# Patient Record
Sex: FEMALE | Race: OTHER | HISPANIC OR LATINO | Employment: UNEMPLOYED | URBAN - METROPOLITAN AREA
[De-identification: names, ages, dates, MRNs, and addresses within clinical notes are randomized per-mention and may not be internally consistent; named-entity substitution may affect disease eponyms.]

---

## 2022-06-01 DIAGNOSIS — Z34.01 ENCOUNTER FOR SUPERVISION OF NORMAL FIRST PREGNANCY IN FIRST TRIMESTER: Primary | ICD-10-CM

## 2022-06-03 ENCOUNTER — PROCEDURE VISIT (OUTPATIENT)
Dept: OBSTETRICS AND GYNECOLOGY | Facility: CLINIC | Age: 22
End: 2022-06-03
Payer: MEDICAID

## 2022-06-03 DIAGNOSIS — Z34.02 ENCOUNTER FOR SUPERVISION OF NORMAL FIRST PREGNANCY IN SECOND TRIMESTER: Primary | ICD-10-CM

## 2022-06-03 DIAGNOSIS — Z34.01 ENCOUNTER FOR SUPERVISION OF NORMAL FIRST PREGNANCY IN FIRST TRIMESTER: ICD-10-CM

## 2022-06-03 PROCEDURE — 76805 OB US >/= 14 WKS SNGL FETUS: CPT | Mod: S$GLB,,, | Performed by: STUDENT IN AN ORGANIZED HEALTH CARE EDUCATION/TRAINING PROGRAM

## 2022-06-03 PROCEDURE — 76805 US OB/GYN PROCEDURE (VIEWPOINT): ICD-10-PCS | Mod: S$GLB,,, | Performed by: STUDENT IN AN ORGANIZED HEALTH CARE EDUCATION/TRAINING PROGRAM

## 2022-06-06 PROBLEM — O99.011 ANEMIA AFFECTING PREGNANCY IN FIRST TRIMESTER: Status: ACTIVE | Noted: 2022-06-06

## 2022-06-06 PROBLEM — O26.899 RH NEGATIVE STATE IN ANTEPARTUM PERIOD: Status: ACTIVE | Noted: 2022-06-06

## 2022-06-06 PROBLEM — Z67.91 RH NEGATIVE STATE IN ANTEPARTUM PERIOD: Status: ACTIVE | Noted: 2022-06-06

## 2022-06-06 LAB
ABS NRBC COUNT: 0 X 10 3/UL (ref 0–0.01)
ABSOLUTE BASOPHIL: 0.02 X 10 3/UL (ref 0–0.22)
ABSOLUTE EOSINOPHIL: 0.12 X 10 3/UL (ref 0.04–0.54)
ABSOLUTE IMMATURE GRAN: 0.09 X 10 3/UL (ref 0–0.04)
ABSOLUTE LYMPHOCYTE: 1.94 X 10 3/UL (ref 0.86–4.75)
ABSOLUTE MONOCYTE: 0.65 X 10 3/UL (ref 0.22–1.08)
AMPHETAMINES (500): NEGATIVE NG/ML
ANTIBODY SCREEN: NEGATIVE
BARBITURATES (200): NEGATIVE NG/ML
BASOPHILS NFR BLD: 0.2 % (ref 0.2–1.2)
BENZODIAZEPINES: NEGATIVE NG/ML
BLOOD GROUPING: NORMAL
BLOOD TYPE (D): NEGATIVE
COCAINE (150): NEGATIVE NG/ML
EOSINOPHIL NFR BLD: 1.2 % (ref 0.7–7)
HBV SURFACE AG SERPL QL IA: NONREACTIVE
HCT VFR BLD AUTO: 30.1 % (ref 37–47)
HCV IGG SERPL QL IA: NONREACTIVE
HGB BLD-MCNC: 9.8 G/DL (ref 12–16)
HIV 1+2 AB+HIV1 P24 AG SERPL QL IA: NONREACTIVE
IMMATURE GRANULOCYTES: 0.9 % (ref 0–0.5)
LYMPHOCYTES NFR BLD: 19.6 % (ref 19.3–53.1)
MARIJUANA, THC (50): NEGATIVE NG/ML
MCH RBC QN AUTO: 29.9 PG (ref 27–32)
MCHC RBC AUTO-ENTMCNC: 32.6 G/DL (ref 32–36)
MCV RBC AUTO: 91.8 FL (ref 82–100)
METHADONE: NEGATIVE NG/ML
MONOCYTES NFR BLD: 6.6 % (ref 4.7–12.5)
NEUTROPHILS # BLD AUTO: 7.09 X 10 3/UL (ref 2.15–7.56)
NEUTROPHILS NFR BLD: 71.5 % (ref 34–71.1)
NUCLEATED RED BLOOD CELLS: 0 /100 WBC (ref 0–0.2)
OPIATES: NEGATIVE NG/ML
OXYCODONE: NEGATIVE NG/ML
PH: 7.8 (ref 4.5–8)
PHENCYCLIDINE (25): NEGATIVE NG/ML
PLATELET # BLD AUTO: 296 X 10 3/UL (ref 135–400)
RBC # BLD AUTO: 3.28 X 10 6/UL (ref 4.2–5.4)
RDW-SD: 43.2 FL (ref 37–54)
RUBELLA IGG SCREEN: NORMAL
SICKLE CELL PREP: NEGATIVE
SYPHILIS TREPONEMAL ANTIBODY: NONREACTIVE
URINE CREATININE D/S: 48.3 MG/DL
WBC # BLD: 9.91 X 10 3/UL (ref 4.3–10.8)

## 2022-06-09 ENCOUNTER — TELEPHONE (OUTPATIENT)
Dept: OBSTETRICS AND GYNECOLOGY | Facility: CLINIC | Age: 22
End: 2022-06-09
Payer: MEDICAID

## 2022-06-10 ENCOUNTER — ROUTINE PRENATAL (OUTPATIENT)
Dept: OBSTETRICS AND GYNECOLOGY | Facility: CLINIC | Age: 22
End: 2022-06-10
Payer: MEDICAID

## 2022-06-10 VITALS — DIASTOLIC BLOOD PRESSURE: 72 MMHG | HEART RATE: 113 BPM | SYSTOLIC BLOOD PRESSURE: 108 MMHG | WEIGHT: 138 LBS

## 2022-06-10 DIAGNOSIS — Z67.91 BLOOD TYPE, RH NEGATIVE: ICD-10-CM

## 2022-06-10 DIAGNOSIS — Z34.83 ENCOUNTER FOR SUPERVISION OF OTHER NORMAL PREGNANCY IN THIRD TRIMESTER: Primary | ICD-10-CM

## 2022-06-10 DIAGNOSIS — Z34.93 THIRD TRIMESTER PREGNANCY: ICD-10-CM

## 2022-06-10 DIAGNOSIS — Z3A.30 30 WEEKS GESTATION OF PREGNANCY: ICD-10-CM

## 2022-06-10 DIAGNOSIS — O09.33 LATE PRENATAL CARE AFFECTING PREGNANCY IN THIRD TRIMESTER: ICD-10-CM

## 2022-06-10 PROBLEM — O09.30 LATE PRENATAL CARE: Status: ACTIVE | Noted: 2022-06-10

## 2022-06-10 PROCEDURE — 99203 OFFICE O/P NEW LOW 30 MIN: CPT | Mod: TH,S$GLB,, | Performed by: STUDENT IN AN ORGANIZED HEALTH CARE EDUCATION/TRAINING PROGRAM

## 2022-06-10 PROCEDURE — 99203 PR OFFICE/OUTPT VISIT, NEW, LEVL III, 30-44 MIN: ICD-10-PCS | Mod: TH,S$GLB,, | Performed by: STUDENT IN AN ORGANIZED HEALTH CARE EDUCATION/TRAINING PROGRAM

## 2022-06-10 NOTE — PROGRESS NOTES
CC: Initial OB visit    HPI:  21 y.o. at 30w1d with EDC of 2022, by 29w Ultrasound.  Complains of nothing today. She does report a h/o seizures, previously on meds about 4-5 yrs ago. She reports no seizures until April this year, she reports having 2 in April but none since that time. She is [place] speaking only, language line used for translation.      ROS:  Negative unless mentioned above    PMH: seizures  PSH: none  Meds: none  ALL: NKDA   OB Hx: : SAB   G2: current   SOC: denies S/E/D   FH: denies family history of congenital defects, mental retardation, twins, cystic fibrosis, Down's syndrome, sickle cell, NTD's, cleft lip/palate, cardiac defects, abdominal wall defects, GYN cancers   GYN Hx: no past history STD's,  Negative History of HSV,  Negative History of Abnormal PAP    PHYSICAL EXAM  Prenatal Vitals  BP: 108/72  Weight: 62.6 kg (138 lb)  Urine Glucose: Negative  Urine Albumin: Negative    There is no height or weight on file to calculate BMI.    Wt Readings from Last 3 Encounters:   06/10/22 62.6 kg (138 lb)     General: NAD, well developed, well nourished  Psych: alert and oriented to person, time and place, normal affect  HEENT: normocephalic, atraumatic  Gravid, soft, NT  Skin: warm, dry  Neuro: normal gait, gross motor function intact  No cyanosis, clubbing or edema    FHT's: +    PNL reviewed: wnl except Hb - 9.8, MBT A-/-    ASSESSMENT: Patient is a 21 y.o.  at 30w1d with  Patient Active Problem List   Diagnosis    Rh negative state in antepartum period    Anemia affecting pregnancy in first trimester    Encounter for supervision of normal pregnancy in third trimester    Late prenatal care     PLAN:  Pt needs Osull and rhogam today  PNL - reviewed, GC/CZ, PAP - not indicated   NIPT ordered today  Will start on iron BID   PNV, Iron  Pain, Fever, Bleeding Precautions  EMILIA, ANKUSH, PreE precautions  Encouraged Breast feeding  F/U in 1 weeks

## 2022-06-17 ENCOUNTER — ROUTINE PRENATAL (OUTPATIENT)
Dept: OBSTETRICS AND GYNECOLOGY | Facility: CLINIC | Age: 22
End: 2022-06-17
Payer: MEDICAID

## 2022-06-17 VITALS — HEART RATE: 82 BPM | SYSTOLIC BLOOD PRESSURE: 108 MMHG | DIASTOLIC BLOOD PRESSURE: 69 MMHG | WEIGHT: 139 LBS

## 2022-06-17 DIAGNOSIS — Z3A.31 31 WEEKS GESTATION OF PREGNANCY: ICD-10-CM

## 2022-06-17 DIAGNOSIS — Z34.83 ENCOUNTER FOR SUPERVISION OF OTHER NORMAL PREGNANCY IN THIRD TRIMESTER: Primary | ICD-10-CM

## 2022-06-17 LAB — GLUCOSE 1 HR POST 50 GM: 83 MG/DL

## 2022-06-17 PROCEDURE — 99213 OFFICE O/P EST LOW 20 MIN: CPT | Mod: TH,S$GLB,, | Performed by: STUDENT IN AN ORGANIZED HEALTH CARE EDUCATION/TRAINING PROGRAM

## 2022-06-17 PROCEDURE — 99213 PR OFFICE/OUTPT VISIT, EST, LEVL III, 20-29 MIN: ICD-10-PCS | Mod: TH,S$GLB,, | Performed by: STUDENT IN AN ORGANIZED HEALTH CARE EDUCATION/TRAINING PROGRAM

## 2022-06-17 NOTE — PROGRESS NOTES
CC: Follow-Up OB    HPI:   21 y.o.  at 31w1d with EDC of 2022, by 29w Ultrasound, here for her follow up OB visit. Complains of nothing today.    Pregnancy ROS:  Positive fetal movement   Negative leakage of fluid   Negative vaginal bleeding   Negative headache, vision changes, RUQ pain, epigastric pain  Negative contractions/abdominal pain   Negative pelvic pressure     Physical Exam:  Prenatal Vitals  BP: 108/69  Weight: 63 kg (139 lb)  Urine Glucose: Negative  Urine Albumin: Negative    Wt Readings from Last 3 Encounters:   22 63 kg (139 lb)   06/10/22 62.6 kg (138 lb)     There is no height or weight on file to calculate BMI.    General: NAD, well developed, well nourished  Psych: alert and oriented to person, time and place, normal affect  HEENT: normocephalic, atraumatic  Abd: Gravid, soft, NT, ND  Skin: warm, dry  Neuro: normal gait, gross motor function intact  No cyanosis, clubbing or edema    FHTs: 130's    Maternal Blood Type: A-/-, s/p rhogam    ASSESSMENT: 21 y.o.  @ 31w1d with   Patient Active Problem List   Diagnosis    Rh negative state in antepartum period    Anemia affecting pregnancy in first trimester    Encounter for supervision of normal pregnancy in third trimester    Late prenatal care     PLAN:  Pt s/p rhogam  Will get Osull today  Pain, fever, bleeding precautions  EMILIA, ANKUSH, PreE precautions  Cont PNV, Iron  Return to clinic in 2 weeks

## 2022-07-01 ENCOUNTER — ROUTINE PRENATAL (OUTPATIENT)
Dept: OBSTETRICS AND GYNECOLOGY | Facility: CLINIC | Age: 22
End: 2022-07-01
Payer: MEDICAID

## 2022-07-01 VITALS — HEART RATE: 77 BPM | WEIGHT: 141 LBS | SYSTOLIC BLOOD PRESSURE: 102 MMHG | DIASTOLIC BLOOD PRESSURE: 65 MMHG

## 2022-07-01 DIAGNOSIS — G40.909 SEIZURE DISORDER DURING PREGNANCY IN THIRD TRIMESTER: ICD-10-CM

## 2022-07-01 DIAGNOSIS — Z3A.33 33 WEEKS GESTATION OF PREGNANCY: ICD-10-CM

## 2022-07-01 DIAGNOSIS — O99.353 SEIZURE DISORDER DURING PREGNANCY IN THIRD TRIMESTER: ICD-10-CM

## 2022-07-01 DIAGNOSIS — Z34.83 ENCOUNTER FOR SUPERVISION OF OTHER NORMAL PREGNANCY IN THIRD TRIMESTER: Primary | ICD-10-CM

## 2022-07-01 PROCEDURE — 99213 OFFICE O/P EST LOW 20 MIN: CPT | Mod: TH,S$GLB,, | Performed by: STUDENT IN AN ORGANIZED HEALTH CARE EDUCATION/TRAINING PROGRAM

## 2022-07-01 PROCEDURE — 99213 PR OFFICE/OUTPT VISIT, EST, LEVL III, 20-29 MIN: ICD-10-PCS | Mod: TH,S$GLB,, | Performed by: STUDENT IN AN ORGANIZED HEALTH CARE EDUCATION/TRAINING PROGRAM

## 2022-07-01 RX ORDER — LEVETIRACETAM 500 MG/1
500 TABLET ORAL 2 TIMES DAILY
Qty: 60 TABLET | Refills: 6 | Status: SHIPPED | OUTPATIENT
Start: 2022-07-01 | End: 2022-08-22

## 2022-07-01 NOTE — PROGRESS NOTES
CC: Follow-Up OB    HPI:   21 y.o.  at 33w1d with EDC of 2022, by 29w Ultrasound, here for her follow up OB visit. Complains of having a seizure on 22. She has not had any other seizure activity since that time.    Pregnancy ROS:  Positive fetal movement   Negative leakage of fluid   Negative vaginal bleeding   Negative headache, vision changes, RUQ pain, epigastric pain  Negative contractions/abdominal pain   Negative pelvic pressure     Physical Exam:  Prenatal Vitals  BP: 102/65  Weight: 64 kg (141 lb)    Wt Readings from Last 3 Encounters:   22 64 kg (141 lb)   22 63 kg (139 lb)   06/10/22 62.6 kg (138 lb)     There is no height or weight on file to calculate BMI.    General: NAD, well developed, well nourished  Psych: alert and oriented to person, time and place, normal affect  HEENT: normocephalic, atraumatic  Abd: Gravid, soft, NT, ND  Skin: warm, dry  Neuro: normal gait, gross motor function intact  No cyanosis, clubbing or edema    FHTs: 120's    Maternal Blood Type: A-/-, s/p rhogam    ASSESSMENT: 21 y.o.  @ 33w1d with   Patient Active Problem List   Diagnosis    Rh negative state in antepartum period    Anemia affecting pregnancy in first trimester    Encounter for supervision of normal pregnancy in third trimester    Late prenatal care     PLAN:  Will start pt on keppra 500mg BID today  Pt to see VIKKI Hampton wnl  GBS at 36 weeks  Pain, fever, bleeding precautions  EMILIA, ANKUSH, PreE precautions  Cont PNV, Iron  Return to clinic in 2 weeks

## 2022-07-08 ENCOUNTER — ROUTINE PRENATAL (OUTPATIENT)
Dept: OBSTETRICS AND GYNECOLOGY | Facility: CLINIC | Age: 22
End: 2022-07-08
Payer: MEDICAID

## 2022-07-08 VITALS — WEIGHT: 142 LBS | HEART RATE: 81 BPM | DIASTOLIC BLOOD PRESSURE: 69 MMHG | SYSTOLIC BLOOD PRESSURE: 104 MMHG

## 2022-07-08 DIAGNOSIS — Z3A.34 34 WEEKS GESTATION OF PREGNANCY: ICD-10-CM

## 2022-07-08 DIAGNOSIS — O99.353 SEIZURE DISORDER DURING PREGNANCY IN THIRD TRIMESTER: ICD-10-CM

## 2022-07-08 DIAGNOSIS — G40.909 SEIZURE DISORDER DURING PREGNANCY IN THIRD TRIMESTER: ICD-10-CM

## 2022-07-08 DIAGNOSIS — O09.93 SUPERVISION OF HIGH RISK PREGNANCY IN THIRD TRIMESTER: Primary | ICD-10-CM

## 2022-07-08 PROCEDURE — 99213 PR OFFICE/OUTPT VISIT, EST, LEVL III, 20-29 MIN: ICD-10-PCS | Mod: TH,S$GLB,, | Performed by: STUDENT IN AN ORGANIZED HEALTH CARE EDUCATION/TRAINING PROGRAM

## 2022-07-08 PROCEDURE — 99213 OFFICE O/P EST LOW 20 MIN: CPT | Mod: TH,S$GLB,, | Performed by: STUDENT IN AN ORGANIZED HEALTH CARE EDUCATION/TRAINING PROGRAM

## 2022-07-08 NOTE — PROGRESS NOTES
CC: Follow-Up OB    HPI:   21 y.o.  at 34w1d with EDC of 2022, by 29w Ultrasound, here for her follow up OB visit. Complains of occasional HAs. She has not started taking any meds for relief. She was started on keppra last week 2/2 seizure activity, she denies any seizures with the med.     Pregnancy ROS:  Positive fetal movement   Negative leakage of fluid   Negative vaginal bleeding   Negative headache, vision changes, RUQ pain, epigastric pain  Negative contractions/abdominal pain   Negative pelvic pressure     Physical Exam:  Prenatal Vitals  BP: 104/69  Weight: 64.4 kg (142 lb)  Urine Glucose: Negative  Urine Albumin: Negative    Wt Readings from Last 3 Encounters:   22 64.4 kg (142 lb)   22 64 kg (141 lb)   22 63 kg (139 lb)     There is no height or weight on file to calculate BMI.    General: NAD, well developed, well nourished  Psych: alert and oriented to person, time and place, normal affect  HEENT: normocephalic, atraumatic  Abd: Gravid, soft, NT, ND  Skin: warm, dry  Neuro: normal gait, gross motor function intact  No cyanosis, clubbing or edema    FHTs: 130's    Maternal Blood Type: A-/-, s/p rhogam    ASSESSMENT: 21 y.o.  @ 34w1d with   Patient Active Problem List   Diagnosis    Rh negative state in antepartum period    Anemia affecting pregnancy in first trimester    Encounter for supervision of normal pregnancy in third trimester    Late prenatal care    Seizure disorder during pregnancy in third trimester     PLAN:  Continue Keppra 500 mg BID  GBS at 36 weeks  Counseled on taking tylenol PRN for HA  Discussed pt with MFM this week  Pain, fever, bleeding precautions  EMILIA, ANKUSH, PreE precautions  Cont PNV, Iron  Return to clinic in 1 weeks

## 2022-07-15 ENCOUNTER — ROUTINE PRENATAL (OUTPATIENT)
Dept: OBSTETRICS AND GYNECOLOGY | Facility: CLINIC | Age: 22
End: 2022-07-15
Payer: MEDICAID

## 2022-07-15 VITALS — DIASTOLIC BLOOD PRESSURE: 69 MMHG | HEART RATE: 81 BPM | SYSTOLIC BLOOD PRESSURE: 103 MMHG | WEIGHT: 142 LBS

## 2022-07-15 DIAGNOSIS — O99.353 SEIZURE DISORDER DURING PREGNANCY IN THIRD TRIMESTER: ICD-10-CM

## 2022-07-15 DIAGNOSIS — G40.909 SEIZURE DISORDER DURING PREGNANCY IN THIRD TRIMESTER: ICD-10-CM

## 2022-07-15 DIAGNOSIS — O09.93 SUPERVISION OF HIGH RISK PREGNANCY IN THIRD TRIMESTER: Primary | ICD-10-CM

## 2022-07-15 DIAGNOSIS — Z3A.35 35 WEEKS GESTATION OF PREGNANCY: ICD-10-CM

## 2022-07-15 PROCEDURE — 99213 PR OFFICE/OUTPT VISIT, EST, LEVL III, 20-29 MIN: ICD-10-PCS | Mod: TH,S$GLB,, | Performed by: STUDENT IN AN ORGANIZED HEALTH CARE EDUCATION/TRAINING PROGRAM

## 2022-07-15 PROCEDURE — 99213 OFFICE O/P EST LOW 20 MIN: CPT | Mod: TH,S$GLB,, | Performed by: STUDENT IN AN ORGANIZED HEALTH CARE EDUCATION/TRAINING PROGRAM

## 2022-07-15 NOTE — PROGRESS NOTES
CC: Follow-Up OB    HPI:   21 y.o.  at 35w1d with EDC of 2022, by 29w Ultrasound, here for her follow up OB visit. Complains of nothing today. Pt is taking her Keppra, denies any seizures.     Pregnancy ROS:  Positive fetal movement   Negative leakage of fluid   Negative vaginal bleeding   Negative headache, vision changes, RUQ pain, epigastric pain  Negative contractions/abdominal pain   Negative pelvic pressure     Physical Exam:  Prenatal Vitals  BP: 103/69  Weight: 64.4 kg (142 lb)  Urine Glucose: Negative  Urine Albumin: Negative    Wt Readings from Last 3 Encounters:   07/15/22 64.4 kg (142 lb)   22 64.4 kg (142 lb)   22 64 kg (141 lb)     There is no height or weight on file to calculate BMI.    General: NAD, well developed, well nourished  Psych: alert and oriented to person, time and place, normal affect  HEENT: normocephalic, atraumatic  Abd: Gravid, soft, NT, ND  Skin: warm, dry  Neuro: normal gait, gross motor function intact  Pelvic: NEFG. Cvx 1-2/50/hi  No cyanosis, clubbing or edema    FHTs: 140's    Maternal Blood Type: A-/-, s/p rhogam    ASSESSMENT: 21 y.o.  @ 35w1d with   Patient Active Problem List   Diagnosis    Rh negative state in antepartum period    Anemia affecting pregnancy in first trimester    Encounter for supervision of normal pregnancy in third trimester    Late prenatal care    Seizure disorder during pregnancy in third trimester     PLAN:  Continue Keppra 500 mg BID  GBS at 36 weeks  Discussed pt with [facility]PRUD[facility], will continue Keppra   Pain, fever, bleeding precautions  EMILIA, ANKUSH, PreE precautions  Cont PNV, Iron  Return to clinic in 1 weeks

## 2022-07-20 ENCOUNTER — TELEPHONE (OUTPATIENT)
Dept: OBSTETRICS AND GYNECOLOGY | Facility: CLINIC | Age: 22
End: 2022-07-20
Payer: MEDICAID

## 2022-07-20 ENCOUNTER — PATIENT MESSAGE (OUTPATIENT)
Dept: OBSTETRICS AND GYNECOLOGY | Facility: CLINIC | Age: 22
End: 2022-07-20
Payer: MEDICAID

## 2022-07-22 ENCOUNTER — ROUTINE PRENATAL (OUTPATIENT)
Dept: OBSTETRICS AND GYNECOLOGY | Facility: CLINIC | Age: 22
End: 2022-07-22
Payer: MEDICAID

## 2022-07-22 ENCOUNTER — PATIENT MESSAGE (OUTPATIENT)
Dept: OBSTETRICS AND GYNECOLOGY | Facility: CLINIC | Age: 22
End: 2022-07-22

## 2022-07-22 ENCOUNTER — TELEPHONE (OUTPATIENT)
Dept: OBSTETRICS AND GYNECOLOGY | Facility: CLINIC | Age: 22
End: 2022-07-22

## 2022-07-22 VITALS — DIASTOLIC BLOOD PRESSURE: 72 MMHG | SYSTOLIC BLOOD PRESSURE: 105 MMHG | WEIGHT: 144 LBS | HEART RATE: 83 BPM

## 2022-07-22 DIAGNOSIS — Z34.03 ENCOUNTER FOR SUPERVISION OF NORMAL FIRST PREGNANCY IN THIRD TRIMESTER: Primary | ICD-10-CM

## 2022-07-22 DIAGNOSIS — G40.909 SEIZURE DISORDER DURING PREGNANCY IN THIRD TRIMESTER: ICD-10-CM

## 2022-07-22 DIAGNOSIS — Z3A.36 36 WEEKS GESTATION OF PREGNANCY: ICD-10-CM

## 2022-07-22 DIAGNOSIS — O99.353 SEIZURE DISORDER DURING PREGNANCY IN THIRD TRIMESTER: ICD-10-CM

## 2022-07-22 PROCEDURE — 99213 PR OFFICE/OUTPT VISIT, EST, LEVL III, 20-29 MIN: ICD-10-PCS | Mod: TH,S$GLB,, | Performed by: STUDENT IN AN ORGANIZED HEALTH CARE EDUCATION/TRAINING PROGRAM

## 2022-07-22 PROCEDURE — 99213 OFFICE O/P EST LOW 20 MIN: CPT | Mod: TH,S$GLB,, | Performed by: STUDENT IN AN ORGANIZED HEALTH CARE EDUCATION/TRAINING PROGRAM

## 2022-07-22 NOTE — PROGRESS NOTES
CC: Follow-Up OB    HPI:   21 y.o.  at 36w1d with EDC of 2022, by 29w Ultrasound, here for her follow up OB visit. Complains of nothing today. Doing well with Keppra, no seizures    Pregnancy ROS:  Positive fetal movement   Negative leakage of fluid   Negative vaginal bleeding   Negative headache, vision changes, RUQ pain, epigastric pain  Negative contractions/abdominal pain   Negative pelvic pressure     Physical Exam:  Prenatal Vitals  BP: 105/72  Weight: 65.3 kg (144 lb)  Urine Glucose: Negative  Urine Albumin: Negative    Wt Readings from Last 3 Encounters:   22 65.3 kg (144 lb)   07/15/22 64.4 kg (142 lb)   22 64.4 kg (142 lb)       There is no height or weight on file to calculate BMI.    General: NAD, well developed, well nourished  Psych: alert and oriented to person, time and place, normal affect  HEENT: normocephalic, atraumatic  Abd: Gravid, soft, NT, ND  Skin: warm, dry  Neuro: normal gait, gross motor function intact  Pelvic: NEFG. Cvx 2-3/25/hi  No cyanosis, clubbing or edema    FHTs: 130's    Maternal Blood Type: A-/-, s/p rhogam    ASSESSMENT: 21 y.o.  @ 36w1d with   Patient Active Problem List   Diagnosis    Rh negative state in antepartum period    Anemia affecting pregnancy in first trimester    Encounter for supervision of normal pregnancy in third trimester    Late prenatal care    Seizure disorder during pregnancy in third trimester       PLAN:  Continue Keppra 500 mg BID  GBS today  Pain, fever, bleeding precautions  EMILIA, ANKUSH, PreE precautions  Cont PNV, Iron  Return to clinic in 1 weeks

## 2022-07-24 LAB
GROUP B STREP MOLECULAR: NEGATIVE
PENICILLIN ALLERGIC: NO

## 2022-07-25 ENCOUNTER — TELEPHONE (OUTPATIENT)
Dept: OBSTETRICS AND GYNECOLOGY | Facility: CLINIC | Age: 22
End: 2022-07-25
Payer: MEDICAID

## 2022-07-26 ENCOUNTER — ROUTINE PRENATAL (OUTPATIENT)
Dept: OBSTETRICS AND GYNECOLOGY | Facility: CLINIC | Age: 22
End: 2022-07-26
Payer: MEDICAID

## 2022-07-26 VITALS — HEART RATE: 99 BPM | WEIGHT: 144 LBS | DIASTOLIC BLOOD PRESSURE: 71 MMHG | SYSTOLIC BLOOD PRESSURE: 106 MMHG

## 2022-07-26 DIAGNOSIS — O99.353 SEIZURE DISORDER DURING PREGNANCY IN THIRD TRIMESTER: ICD-10-CM

## 2022-07-26 DIAGNOSIS — Z3A.36 36 WEEKS GESTATION OF PREGNANCY: ICD-10-CM

## 2022-07-26 DIAGNOSIS — O09.93 SUPERVISION OF HIGH RISK PREGNANCY IN THIRD TRIMESTER: Primary | ICD-10-CM

## 2022-07-26 DIAGNOSIS — G40.909 SEIZURE DISORDER DURING PREGNANCY IN THIRD TRIMESTER: ICD-10-CM

## 2022-07-26 PROCEDURE — 99213 OFFICE O/P EST LOW 20 MIN: CPT | Mod: TH,S$GLB,, | Performed by: STUDENT IN AN ORGANIZED HEALTH CARE EDUCATION/TRAINING PROGRAM

## 2022-07-26 PROCEDURE — 99213 PR OFFICE/OUTPT VISIT, EST, LEVL III, 20-29 MIN: ICD-10-PCS | Mod: TH,S$GLB,, | Performed by: STUDENT IN AN ORGANIZED HEALTH CARE EDUCATION/TRAINING PROGRAM

## 2022-07-26 NOTE — PROGRESS NOTES
CC: Follow-Up OB    HPI:   21 y.o.  at 36w5d with EDC of 2022, by 29w Ultrasound, here for her follow up OB visit. Complains of nothing today.    Pregnancy ROS:  Positive fetal movement   Negative leakage of fluid   Negative vaginal bleeding   Negative headache, vision changes, RUQ pain, epigastric pain  Negative contractions/abdominal pain   Negative pelvic pressure     Physical Exam:  Prenatal Vitals  BP: 106/71  Weight: 65.3 kg (144 lb)  Urine Glucose: Negative  Urine Albumin: Negative    Wt Readings from Last 3 Encounters:   22 65.3 kg (144 lb)   22 65.3 kg (144 lb)   07/15/22 64.4 kg (142 lb)     There is no height or weight on file to calculate BMI.    General: NAD, well developed, well nourished  Psych: alert and oriented to person, time and place, normal affect  HEENT: normocephalic, atraumatic  Abd: Gravid, soft, NT, ND  Skin: warm, dry  Neuro: normal gait, gross motor function intact  Pelvic: NEFG. Cvx 2-3/25/hi  No cyanosis, clubbing or edema    FHTs: 130's    Maternal Blood Type: A-/-, s/p rhogam    ASSESSMENT: 21 y.o.  @ 36w5d with   Patient Active Problem List   Diagnosis    Rh negative state in antepartum period    Anemia affecting pregnancy in first trimester    Encounter for supervision of normal pregnancy in third trimester    Late prenatal care    Seizure disorder during pregnancy in third trimester     PLAN:  Continue Keppra 500 mg BID  GBS negative  Pain, fever, bleeding precautions  EMILIA, ANKUSH, PreE precautions  Cont PNV, Iron  Return to clinic in 1 weeks

## 2022-08-05 ENCOUNTER — ROUTINE PRENATAL (OUTPATIENT)
Dept: OBSTETRICS AND GYNECOLOGY | Facility: CLINIC | Age: 22
End: 2022-08-05
Payer: MEDICAID

## 2022-08-05 VITALS — SYSTOLIC BLOOD PRESSURE: 106 MMHG | DIASTOLIC BLOOD PRESSURE: 71 MMHG | HEART RATE: 82 BPM | WEIGHT: 150 LBS

## 2022-08-05 DIAGNOSIS — O09.93 SUPERVISION OF HIGH RISK PREGNANCY IN THIRD TRIMESTER: Primary | ICD-10-CM

## 2022-08-05 DIAGNOSIS — Z3A.38 38 WEEKS GESTATION OF PREGNANCY: ICD-10-CM

## 2022-08-05 DIAGNOSIS — O99.353 SEIZURE DISORDER DURING PREGNANCY IN THIRD TRIMESTER: ICD-10-CM

## 2022-08-05 DIAGNOSIS — G40.909 SEIZURE DISORDER DURING PREGNANCY IN THIRD TRIMESTER: ICD-10-CM

## 2022-08-05 PROCEDURE — 99213 PR OFFICE/OUTPT VISIT, EST, LEVL III, 20-29 MIN: ICD-10-PCS | Mod: TH,S$GLB,, | Performed by: STUDENT IN AN ORGANIZED HEALTH CARE EDUCATION/TRAINING PROGRAM

## 2022-08-05 PROCEDURE — 99213 OFFICE O/P EST LOW 20 MIN: CPT | Mod: TH,S$GLB,, | Performed by: STUDENT IN AN ORGANIZED HEALTH CARE EDUCATION/TRAINING PROGRAM

## 2022-08-05 NOTE — PROGRESS NOTES
CC: Follow-Up OB    HPI:   21 y.o.  at 38w1d with EDC of 2022, by 29w Ultrasound, here for her follow up OB visit. Complains of nothing today. Denies seizures.    Pregnancy ROS:  Positive fetal movement   Negative leakage of fluid   Negative vaginal bleeding   Negative headache, vision changes, RUQ pain, epigastric pain  Negative contractions/abdominal pain   Negative pelvic pressure     Physical Exam:  Prenatal Vitals  BP: 106/71  Weight: 68 kg (150 lb)    Wt Readings from Last 3 Encounters:   22 68 kg (150 lb)   22 65.3 kg (144 lb)   22 65.3 kg (144 lb)     There is no height or weight on file to calculate BMI.    General: NAD, well developed, well nourished  Psych: alert and oriented to person, time and place, normal affect  HEENT: normocephalic, atraumatic  Abd: Gravid, soft, NT, ND  Skin: warm, dry  Neuro: normal gait, gross motor function intact  Pelvic: NEFG. Cvx 3/50/hi  No cyanosis, clubbing or edema    FHTs: 130's    Maternal Blood Type: A-/-, s/p rhogam    ASSESSMENT: 21 y.o.  @ 38w1d with   Patient Active Problem List   Diagnosis    Rh negative state in antepartum period    Anemia affecting pregnancy in first trimester    Encounter for supervision of normal pregnancy in third trimester    Late prenatal care    Seizure disorder during pregnancy in third trimester     PLAN:  Continue Keppra 500 mg BID  GBS negative  Discussed IOL, pt declines  Pain, fever, bleeding precautions  EMILIA, ANKUSH, PreE precautions  Cont PNV, Iron  Return to clinic in 1 weeks

## 2022-08-08 ENCOUNTER — PATIENT MESSAGE (OUTPATIENT)
Dept: OBSTETRICS AND GYNECOLOGY | Facility: CLINIC | Age: 22
End: 2022-08-08
Payer: MEDICAID

## 2022-08-12 ENCOUNTER — ROUTINE PRENATAL (OUTPATIENT)
Dept: OBSTETRICS AND GYNECOLOGY | Facility: CLINIC | Age: 22
End: 2022-08-12
Payer: MEDICAID

## 2022-08-12 VITALS — HEART RATE: 83 BPM | WEIGHT: 148 LBS | SYSTOLIC BLOOD PRESSURE: 107 MMHG | DIASTOLIC BLOOD PRESSURE: 71 MMHG

## 2022-08-12 DIAGNOSIS — O09.93 SUPERVISION OF HIGH RISK PREGNANCY IN THIRD TRIMESTER: Primary | ICD-10-CM

## 2022-08-12 DIAGNOSIS — G40.909 SEIZURE DISORDER DURING PREGNANCY IN THIRD TRIMESTER: ICD-10-CM

## 2022-08-12 DIAGNOSIS — O99.353 SEIZURE DISORDER DURING PREGNANCY IN THIRD TRIMESTER: ICD-10-CM

## 2022-08-12 DIAGNOSIS — Z3A.39 39 WEEKS GESTATION OF PREGNANCY: ICD-10-CM

## 2022-08-12 PROCEDURE — 99213 OFFICE O/P EST LOW 20 MIN: CPT | Mod: TH,S$GLB,, | Performed by: STUDENT IN AN ORGANIZED HEALTH CARE EDUCATION/TRAINING PROGRAM

## 2022-08-12 PROCEDURE — 99213 PR OFFICE/OUTPT VISIT, EST, LEVL III, 20-29 MIN: ICD-10-PCS | Mod: TH,S$GLB,, | Performed by: STUDENT IN AN ORGANIZED HEALTH CARE EDUCATION/TRAINING PROGRAM

## 2022-08-12 NOTE — PROGRESS NOTES
CC: Follow-Up OB    HPI:   21 y.o.  at 39w1d with EDC of 2022, by 29w Ultrasound, here for her follow up OB visit. Complains of nothing today.    Pregnancy ROS:  Positive fetal movement   Negative leakage of fluid   Negative vaginal bleeding   Negative headache, vision changes, RUQ pain, epigastric pain  Negative contractions/abdominal pain   Negative pelvic pressure     Physical Exam:  Prenatal Vitals  BP: 107/71  Weight: 67.1 kg (148 lb)  Urine Glucose: Negative  Urine Albumin: Negative    Wt Readings from Last 3 Encounters:   22 67.1 kg (148 lb)   22 68 kg (150 lb)   22 65.3 kg (144 lb)     There is no height or weight on file to calculate BMI.    General: NAD, well developed, well nourished  Psych: alert and oriented to person, time and place, normal affect  HEENT: normocephalic, atraumatic  Abd: Gravid, soft, NT, ND  Skin: warm, dry  Neuro: normal gait, gross motor function intact  No cyanosis, clubbing or edema    FHTs: 130's    Maternal Blood Type: A-/-, s/p rhogam    ASSESSMENT: 21 y.o.  @ 39w1d with   Patient Active Problem List   Diagnosis    Rh negative state in antepartum period    Anemia affecting pregnancy in first trimester    Encounter for supervision of normal pregnancy in third trimester    Late prenatal care    Seizure disorder during pregnancy in third trimester     PLAN:  Continue Keppra 500 mg BID  GBS negative  Pt now desires IOL, IOL scheduled for  with pit  Pain, fever, bleeding precautions  EMILIA, ANKUSH, PreE precautions  Cont PNV, Iron  Return to clinic in 1 weeks

## 2022-08-16 ENCOUNTER — OUTSIDE PLACE OF SERVICE (OUTPATIENT)
Dept: OBSTETRICS AND GYNECOLOGY | Facility: CLINIC | Age: 22
End: 2022-08-16
Payer: MEDICAID

## 2022-08-16 PROCEDURE — 0502F SUBSEQUENT PRENATAL CARE: CPT | Mod: ,,, | Performed by: STUDENT IN AN ORGANIZED HEALTH CARE EDUCATION/TRAINING PROGRAM

## 2022-08-16 PROCEDURE — 0502F PR SUBSEQUENT PRENATAL CARE: ICD-10-PCS | Mod: ,,, | Performed by: STUDENT IN AN ORGANIZED HEALTH CARE EDUCATION/TRAINING PROGRAM

## 2022-08-16 PROCEDURE — 59409 OBSTETRICAL CARE: CPT | Mod: GB,,, | Performed by: STUDENT IN AN ORGANIZED HEALTH CARE EDUCATION/TRAINING PROGRAM

## 2022-08-16 PROCEDURE — 59409 PR OBSTETRICAL CARE,VAG DELIV ONLY: ICD-10-PCS | Mod: GB,,, | Performed by: STUDENT IN AN ORGANIZED HEALTH CARE EDUCATION/TRAINING PROGRAM

## 2022-08-17 PROCEDURE — 99231 SBSQ HOSP IP/OBS SF/LOW 25: CPT | Mod: ,,, | Performed by: STUDENT IN AN ORGANIZED HEALTH CARE EDUCATION/TRAINING PROGRAM

## 2022-08-17 PROCEDURE — 99231 PR SUBSEQUENT HOSPITAL CARE,LEVL I: ICD-10-PCS | Mod: ,,, | Performed by: STUDENT IN AN ORGANIZED HEALTH CARE EDUCATION/TRAINING PROGRAM

## 2022-08-18 PROCEDURE — 99238 PR HOSPITAL DISCHARGE DAY,<30 MIN: ICD-10-PCS | Mod: ,,, | Performed by: STUDENT IN AN ORGANIZED HEALTH CARE EDUCATION/TRAINING PROGRAM

## 2022-08-18 PROCEDURE — 99238 HOSP IP/OBS DSCHRG MGMT 30/<: CPT | Mod: ,,, | Performed by: STUDENT IN AN ORGANIZED HEALTH CARE EDUCATION/TRAINING PROGRAM

## 2022-08-22 ENCOUNTER — PATIENT MESSAGE (OUTPATIENT)
Dept: OBSTETRICS AND GYNECOLOGY | Facility: CLINIC | Age: 22
End: 2022-08-22
Payer: MEDICAID

## 2022-08-22 RX ORDER — LEVETIRACETAM 1000 MG/1
1000 TABLET ORAL 2 TIMES DAILY
Qty: 60 TABLET | Refills: 3 | Status: SHIPPED | OUTPATIENT
Start: 2022-08-22 | End: 2022-10-10 | Stop reason: SDUPTHER

## 2022-09-27 ENCOUNTER — TELEPHONE (OUTPATIENT)
Dept: OBSTETRICS AND GYNECOLOGY | Facility: CLINIC | Age: 22
End: 2022-09-27
Payer: MEDICAID

## 2022-10-10 ENCOUNTER — POSTPARTUM VISIT (OUTPATIENT)
Dept: OBSTETRICS AND GYNECOLOGY | Facility: CLINIC | Age: 22
End: 2022-10-10
Payer: MEDICAID

## 2022-10-10 VITALS — WEIGHT: 139 LBS | HEART RATE: 90 BPM | DIASTOLIC BLOOD PRESSURE: 72 MMHG | SYSTOLIC BLOOD PRESSURE: 111 MMHG

## 2022-10-10 DIAGNOSIS — Z30.011 ENCOUNTER FOR INITIAL PRESCRIPTION OF CONTRACEPTIVE PILLS: ICD-10-CM

## 2022-10-10 DIAGNOSIS — G40.909 SEIZURE DISORDER: ICD-10-CM

## 2022-10-10 PROBLEM — O26.899 RH NEGATIVE STATE IN ANTEPARTUM PERIOD: Status: RESOLVED | Noted: 2022-06-06 | Resolved: 2022-10-10

## 2022-10-10 PROBLEM — O99.011 ANEMIA AFFECTING PREGNANCY IN FIRST TRIMESTER: Status: RESOLVED | Noted: 2022-06-06 | Resolved: 2022-10-10

## 2022-10-10 PROBLEM — Z34.93 ENCOUNTER FOR SUPERVISION OF NORMAL PREGNANCY IN THIRD TRIMESTER: Status: RESOLVED | Noted: 2022-06-10 | Resolved: 2022-10-10

## 2022-10-10 PROBLEM — Z67.91 RH NEGATIVE STATE IN ANTEPARTUM PERIOD: Status: RESOLVED | Noted: 2022-06-06 | Resolved: 2022-10-10

## 2022-10-10 PROBLEM — O09.30 LATE PRENATAL CARE: Status: RESOLVED | Noted: 2022-06-10 | Resolved: 2022-10-10

## 2022-10-10 PROCEDURE — 59430 PR CARE AFTER DELIVERY ONLY: ICD-10-PCS | Mod: S$GLB,,, | Performed by: STUDENT IN AN ORGANIZED HEALTH CARE EDUCATION/TRAINING PROGRAM

## 2022-10-10 RX ORDER — NORETHINDRONE ACETATE AND ETHINYL ESTRADIOL .02; 1 MG/1; MG/1
1 TABLET ORAL DAILY
Qty: 90 TABLET | Refills: 3 | Status: SHIPPED | OUTPATIENT
Start: 2022-10-10 | End: 2023-10-10

## 2022-10-10 RX ORDER — LEVETIRACETAM 1000 MG/1
1000 TABLET ORAL 2 TIMES DAILY
Qty: 60 TABLET | Refills: 1 | Status: SHIPPED | OUTPATIENT
Start: 2022-10-10 | End: 2023-10-10

## 2022-10-10 NOTE — PROGRESS NOTES
Patient is a 22-year-old  female presents for 6 week postpartum visit following vaginal delivery.  Reports doing well today.  No vaginal bleeding or pain.  Denies cycles since her delivery.  She is interested in starting OCPs for contraception.  She does report having 2 seizures postpartum, patient did have her Keppra dose increased shortly after delivery.  She reports being seen at outside hospital.  She denies VB/VD/dysuria/Denies any HA, vision changes, F/C, LE swelling. Denies any breast pain/soreness. Denies postpartum depression.     Vitals:    10/10/22 1351   BP: 111/72   Pulse: 90   Weight: 63 kg (139 lb)     There is no height or weight on file to calculate BMI.  Gen:WDWN in NAD  NC/AT  Non labored breathing  Abd soft, NT/ND  Pelvic NFEG no lesions no discharge, Vaginal walls pink moist well rugated no lesions  Skin: warm and dry  Ext no edema    Patient Active Problem List   Diagnosis    Seizure disorder     (spontaneous vaginal delivery)     Plan   Pt moving to [place] next week  Will refer pt to Neurology at [facility] for evaluation of her seizures  Will refill Keppra today  Pt desires OCPs for contraception, will prescribe junel today  Pt encouraged to find PCP and GYN after moving for continued care  RTC PRN